# Patient Record
Sex: FEMALE | Race: WHITE | Employment: OTHER | URBAN - METROPOLITAN AREA
[De-identification: names, ages, dates, MRNs, and addresses within clinical notes are randomized per-mention and may not be internally consistent; named-entity substitution may affect disease eponyms.]

---

## 2017-12-08 ENCOUNTER — HOSPITAL ENCOUNTER (OUTPATIENT)
Dept: ENDOSCOPY | Age: 67
Discharge: HOME | End: 2017-12-08
Attending: INTERNAL MEDICINE | Admitting: INTERNAL MEDICINE

## 2017-12-08 NOTE — PLAN OF CARE
ADMISSION PRE-PROCEDURE INTRA-PROCEDURE POST-PROCEDURE: RECOVERY/ DISCHARGE   ASSESSMENT &  EVALUATION CONSULTS [x] Verify patient identification, allergies, vital signs, NPO, IV, & SPO2  [x] Complete the VARUN SCORE  [x] Consent form to treat signed  [x] History and Physical [x] Reassess patient after pre- procedure medication given  [x] GI physician evaluates pt  [x] Verify patient's name, allergies [x] Continuous monitoring of vital  signs, SPO2, LOC  [x] Emotional status  [x] Patient comfort level [x] Total system admission assessment with appropriate intervention  [x] Pain evaluation and management  [x] Discharge criteria met  [x] Discharge assessment with appropriate intervention  [x] Compare with pre-procedure status  [x] Discharge by appropriate physician   DIAGNOSTIC / TESTS [x]  Lab work ordered  [x]  Obtain and attach lab work to patient's chart  [x]  Report abnormals and F/U with physician [x] Assure needed test results are present [x] Diagnostic testing as indicated  [x] Obtained specimens sent to lab [x] Diagnostic testing as indicated     MEDICATIONS [x]  Conscious sedation medications  explained to patient  [x]  Start IV per physician's order  and/or protocol  [x]  Verify compliance of the colon prep  []  Pre-procedure med. as ordered  [x] Verify compliance of colon prep. [x] Re-check IV access [x] Assist with administration of IV conscious sedation medication  [x] Start O2  per  nasal cannula, if needed   [x] IV fluids as indicated/ordered  [x] Administration of medications as ordered  [x] Medications as prescribed  [x] D/C O2 therapy as ordered   PROCEDURE/TREATMENT [x] Specific order by GI physician  [x] Specific procedure as scheduled  [x]  Verify procedure as ordered [x] Time out/procedure verification checklist complete.  [x] EGD/Colonoscopy and related procedures  [x] Assist physician with the procedure [x] Treatment as indicated   NUTRITION / DIET [x] NPO after midnight, as ordered [x] Verify NPO status [x] IV fluids as support [x] Clear liquids and/or ice chips as ordered  [x] Tolerating clear liquids  [x] Special diet as ordered  [x] D/C IV fluids   ACTIVITY  [x] Assess level of function  [x]  Specified by physician  [x]  Activity as tolerated [x] Position on left side [x] Position on left side and reposition patient as physician ordered [x] Gradually elevate HOB to henrys position  [x] Position changes as patient tolerated  [x] Ambulate as pre-procedure   PATIENT / S.O. EDUCATION [x] Pre, Intra Post-procedure  teaching appropriate to procedure  [x] Conscious Sedation Teaching  [x] Pain Management - instructed [x] Encourage questions  [x] Clarify any concerns [x] Safety devices maintain to  prevent patient injury  [x] Assist and support patient  [x] Observe standard precautions [x] Physician confers with the family/S.O. [x] Short visit from family in [place] area  [x] Physician specific post-procedure orders  [x] S/S complications with proper [x]F/U; office visits F/U  [x] Review discharge instructions, medicine to family /S. O. [x] Medication/ special diet information given to family/ S.O. [x]  Copy of discharge instructions givn to family/S.O.   OUTCOME PLANNING  DISCHARGE PLAN [x] Patient/S. O. will verbalize understanding of admission procedure & expectations of outcome in realistic terms   [x] Patient verbalize the role of family/S. O. in plan of care  [x] Patient will have designated responsible person available for discharge.  [x] Patient will demonstrate an  understanding of the planned  procedure and its related procedures and conscious sedation [x] Patient will:  - receive services according to the           standards of care  - receive standards for conscious       sedation  - remain free of injury [x] Patient will:  - have stable vital signs based on Miryam Score   - be pain free or tolerable, have no bleeding  - have minimal abdominal distention   -  return to pre-procedure level of orientation   -  tolerate fluid with no nausea and vomiting  -  ambulate as pre-procedure ADL   - verbalize understanding of the discharge instructions     Via Torsten Sanchez 9:14 AM

## 2019-08-15 ENCOUNTER — HOSPITAL ENCOUNTER (OUTPATIENT)
Age: 69
Setting detail: OUTPATIENT SURGERY
Discharge: HOME | End: 2019-08-15
Attending: INTERNAL MEDICINE | Admitting: INTERNAL MEDICINE
Payer: MEDICARE

## 2019-08-15 VITALS
RESPIRATION RATE: 16 BRPM | SYSTOLIC BLOOD PRESSURE: 116 MMHG | WEIGHT: 140 LBS | HEIGHT: 64 IN | HEART RATE: 62 BPM | DIASTOLIC BLOOD PRESSURE: 62 MMHG | OXYGEN SATURATION: 92 % | TEMPERATURE: 97.1 F | BODY MASS INDEX: 23.9 KG/M2

## 2019-08-15 PROCEDURE — 2709999900 HC NON-CHARGEABLE SUPPLY: Performed by: INTERNAL MEDICINE

## 2019-08-15 PROCEDURE — 88305 TISSUE EXAM BY PATHOLOGIST: CPT

## 2019-08-15 PROCEDURE — 2580000003 HC RX 258: Performed by: INTERNAL MEDICINE

## 2019-08-15 PROCEDURE — 99152 MOD SED SAME PHYS/QHP 5/>YRS: CPT | Performed by: INTERNAL MEDICINE

## 2019-08-15 PROCEDURE — 7100000010 HC PHASE II RECOVERY - FIRST 15 MIN: Performed by: INTERNAL MEDICINE

## 2019-08-15 PROCEDURE — 2500000003 HC RX 250 WO HCPCS: Performed by: INTERNAL MEDICINE

## 2019-08-15 PROCEDURE — 3609010300 HC COLONOSCOPY W/BIOPSY SINGLE/MULTIPLE: Performed by: INTERNAL MEDICINE

## 2019-08-15 PROCEDURE — 99153 MOD SED SAME PHYS/QHP EA: CPT | Performed by: INTERNAL MEDICINE

## 2019-08-15 PROCEDURE — 6360000002 HC RX W HCPCS: Performed by: INTERNAL MEDICINE

## 2019-08-15 PROCEDURE — 7100000011 HC PHASE II RECOVERY - ADDTL 15 MIN: Performed by: INTERNAL MEDICINE

## 2019-08-15 RX ORDER — MEPERIDINE HYDROCHLORIDE 50 MG/ML
INJECTION INTRAMUSCULAR; INTRAVENOUS; SUBCUTANEOUS PRN
Status: DISCONTINUED | OUTPATIENT
Start: 2019-08-15 | End: 2019-08-15 | Stop reason: ALTCHOICE

## 2019-08-15 RX ORDER — AMPICILLIN AND SULBACTAM 2; 1 G/1; G/1
3 INJECTION, POWDER, FOR SOLUTION INTRAMUSCULAR; INTRAVENOUS ONCE
Status: DISCONTINUED | OUTPATIENT
Start: 2019-08-15 | End: 2019-08-15

## 2019-08-15 RX ORDER — MIDAZOLAM HYDROCHLORIDE 1 MG/ML
INJECTION INTRAMUSCULAR; INTRAVENOUS PRN
Status: DISCONTINUED | OUTPATIENT
Start: 2019-08-15 | End: 2019-08-15 | Stop reason: ALTCHOICE

## 2019-08-15 RX ADMIN — AMPICILLIN SODIUM AND SULBACTAM SODIUM 3 G: 2; 1 INJECTION, POWDER, FOR SOLUTION INTRAMUSCULAR; INTRAVENOUS at 07:44

## 2019-08-15 ASSESSMENT — PAIN - FUNCTIONAL ASSESSMENT
PAIN_FUNCTIONAL_ASSESSMENT: 0-10
PAIN_FUNCTIONAL_ASSESSMENT: FACES

## 2023-06-22 ENCOUNTER — ANESTHESIA EVENT (OUTPATIENT)
Dept: ENDOSCOPY | Age: 73
End: 2023-06-22
Payer: MEDICARE

## 2023-06-23 ENCOUNTER — ANESTHESIA (OUTPATIENT)
Dept: ENDOSCOPY | Age: 73
End: 2023-06-23
Payer: MEDICARE

## 2023-06-23 ENCOUNTER — HOSPITAL ENCOUNTER (OUTPATIENT)
Age: 73
Setting detail: OUTPATIENT SURGERY
Discharge: HOME | End: 2023-06-23
Attending: INTERNAL MEDICINE | Admitting: INTERNAL MEDICINE
Payer: MEDICARE

## 2023-06-23 VITALS
SYSTOLIC BLOOD PRESSURE: 126 MMHG | BODY MASS INDEX: 22.2 KG/M2 | DIASTOLIC BLOOD PRESSURE: 56 MMHG | TEMPERATURE: 97.3 F | HEIGHT: 64 IN | WEIGHT: 130 LBS | RESPIRATION RATE: 16 BRPM | OXYGEN SATURATION: 97 % | HEART RATE: 66 BPM

## 2023-06-23 DIAGNOSIS — K51.90 ULCERATIVE COLITIS WITHOUT COMPLICATIONS, UNSPECIFIED LOCATION (HCC): ICD-10-CM

## 2023-06-23 PROCEDURE — 3609010300 HC COLONOSCOPY W/BIOPSY SINGLE/MULTIPLE: Performed by: INTERNAL MEDICINE

## 2023-06-23 PROCEDURE — 7100000011 HC PHASE II RECOVERY - ADDTL 15 MIN: Performed by: INTERNAL MEDICINE

## 2023-06-23 PROCEDURE — 7100000010 HC PHASE II RECOVERY - FIRST 15 MIN: Performed by: INTERNAL MEDICINE

## 2023-06-23 PROCEDURE — 6360000002 HC RX W HCPCS: Performed by: INTERNAL MEDICINE

## 2023-06-23 PROCEDURE — 3700000000 HC ANESTHESIA ATTENDED CARE: Performed by: INTERNAL MEDICINE

## 2023-06-23 PROCEDURE — 3700000001 HC ADD 15 MINUTES (ANESTHESIA): Performed by: INTERNAL MEDICINE

## 2023-06-23 PROCEDURE — 2500000003 HC RX 250 WO HCPCS: Performed by: INTERNAL MEDICINE

## 2023-06-23 PROCEDURE — 2709999900 HC NON-CHARGEABLE SUPPLY: Performed by: INTERNAL MEDICINE

## 2023-06-23 PROCEDURE — 2580000003 HC RX 258: Performed by: FAMILY MEDICINE

## 2023-06-23 PROCEDURE — 88305 TISSUE EXAM BY PATHOLOGIST: CPT

## 2023-06-23 PROCEDURE — 2580000003 HC RX 258: Performed by: INTERNAL MEDICINE

## 2023-06-23 PROCEDURE — 6360000002 HC RX W HCPCS: Performed by: NURSE ANESTHETIST, CERTIFIED REGISTERED

## 2023-06-23 RX ORDER — PROPOFOL 10 MG/ML
INJECTION, EMULSION INTRAVENOUS PRN
Status: DISCONTINUED | OUTPATIENT
Start: 2023-06-23 | End: 2023-06-23 | Stop reason: SDUPTHER

## 2023-06-23 RX ORDER — LATANOPROST 50 UG/ML
1 SOLUTION/ DROPS OPHTHALMIC NIGHTLY
COMMUNITY
Start: 2023-04-28

## 2023-06-23 RX ORDER — AMPICILLIN AND SULBACTAM 2; 1 G/1; G/1
3000 INJECTION, POWDER, FOR SOLUTION INTRAMUSCULAR; INTRAVENOUS ONCE
Status: DISCONTINUED | OUTPATIENT
Start: 2023-06-23 | End: 2023-06-23 | Stop reason: CLARIF

## 2023-06-23 RX ORDER — ONDANSETRON 2 MG/ML
INJECTION INTRAMUSCULAR; INTRAVENOUS PRN
Status: DISCONTINUED | OUTPATIENT
Start: 2023-06-23 | End: 2023-06-23 | Stop reason: SDUPTHER

## 2023-06-23 RX ORDER — SODIUM CHLORIDE, SODIUM LACTATE, POTASSIUM CHLORIDE, CALCIUM CHLORIDE 600; 310; 30; 20 MG/100ML; MG/100ML; MG/100ML; MG/100ML
INJECTION, SOLUTION INTRAVENOUS CONTINUOUS
Status: DISCONTINUED | OUTPATIENT
Start: 2023-06-23 | End: 2023-06-23 | Stop reason: HOSPADM

## 2023-06-23 RX ORDER — ALENDRONATE SODIUM 10 MG/1
10 TABLET ORAL WEEKLY
COMMUNITY
Start: 2023-06-13

## 2023-06-23 RX ORDER — LIDOCAINE HYDROCHLORIDE 20 MG/ML
INJECTION, SOLUTION INTRAVENOUS PRN
Status: DISCONTINUED | OUTPATIENT
Start: 2023-06-23 | End: 2023-06-23 | Stop reason: SDUPTHER

## 2023-06-23 RX ORDER — PROPOFOL 10 MG/ML
INJECTION, EMULSION INTRAVENOUS CONTINUOUS PRN
Status: DISCONTINUED | OUTPATIENT
Start: 2023-06-23 | End: 2023-06-23 | Stop reason: SDUPTHER

## 2023-06-23 RX ADMIN — ONDANSETRON 4 MG: 2 INJECTION INTRAMUSCULAR; INTRAVENOUS at 08:23

## 2023-06-23 RX ADMIN — PROPOFOL 75 MG: 10 INJECTION, EMULSION INTRAVENOUS at 08:27

## 2023-06-23 RX ADMIN — LIDOCAINE HYDROCHLORIDE 50 MG: 20 INJECTION, SOLUTION INTRAVENOUS at 08:27

## 2023-06-23 RX ADMIN — SODIUM CHLORIDE, POTASSIUM CHLORIDE, SODIUM LACTATE AND CALCIUM CHLORIDE: 600; 310; 30; 20 INJECTION, SOLUTION INTRAVENOUS at 07:43

## 2023-06-23 RX ADMIN — SODIUM CHLORIDE 3000 MG: 900 INJECTION INTRAVENOUS at 07:41

## 2023-06-23 RX ADMIN — PROPOFOL 125 MCG/KG/MIN: 10 INJECTION, EMULSION INTRAVENOUS at 08:28

## 2023-06-23 ASSESSMENT — PAIN - FUNCTIONAL ASSESSMENT: PAIN_FUNCTIONAL_ASSESSMENT: 0-10

## 2023-06-23 NOTE — DISCHARGE INSTRUCTIONS
ENDOSCOPY DISCHARGE INSTRUCTIONS:    Call the physician that did your procedure for any questions or concerns:           DR. Marrianne Skiff:  [phone]               ACTIVITY:    There are potential side effects from the medications used for sedation and anesthesia during your procedure. These include:  Dizziness or light-headedness, confusion or memory loss, delayed reaction times, loss of coordination, nausea and vomiting. Because of your increased risk for injury, we ask that you observe the following precautions: For the next 24 hours,  DO NOT operate an automobile, bicycle, motorcycle, , power tools or large equipment of any kind. Do not drink alcohol, sign any legal documents or make any legal decisions for 24 hours. Do not bend your head over lower than your heart. DO sit on the side of bed/couch awhile before getting up. Plan on bedrest or quiet relaxation today. You may resume normal activities in 24 hours. DIET:    Your first meal today should be light, avoiding spicy and fatty foods. If you tolerate this first meal, then you may advance to your regular diet unless otherwise advised by your physician. NORMAL SYMPTOMS:  -Mild sore throat if youve had an EGD   -Gaseous discomfort if you've had an EGD or Colonoscopy. NOTIFY YOUR PHYSICIAN IF THESE SYMPTOMS OCCUR:  1. Fever (greater than 100)  5. Increased abdominal bloating  2. Severe pain    6. Excessive bleeding  3. Nausea and vomiting  7. Chest pain                                                                    4. Chills    8. Shortness of breath      ADDITIONAL INSTRUCTIONS:    Biopsy results: To be discussed at your follow-up visit. Educational Information:         Learning About Colitis  What is colitis? Colitis is swelling (inflammation) of the colon. The colon makes up most of the large intestine. Many conditions can cause colitis. What are the symptoms?   Symptoms of colitis may include diarrhea that may be bloody,

## 2023-06-23 NOTE — ANESTHESIA PRE PROCEDURE
Department of Anesthesiology  Preprocedure Note       Name:  Amber Hill   Age:  72 y.o.  :  1950                                          MRN:  [id number]         Date:  2023      Surgeon: Radha Alan):  Wanda Oliveira MD    Procedure: Procedure(s):  COLONOSCOPY WITH ANTIBIOTIC    Medications prior to admission:   Prior to Admission medications    Medication Sig Start Date End Date Taking? Authorizing Provider   dorzolamide (TRUSOPT) 2 % ophthalmic solution  10/13/14   Historical Provider, MD   azithromycin (AZASITE) 1 % ophthalmic solution 2 times daily. 14   Jeanne Jaimes MD   travoprost, benzalkonium, (TRAVATAN) 0.004 % ophthalmic solution 1 drop nightly. Historical Provider, MD       Current medications:    Current Facility-Administered Medications   Medication Dose Route Frequency Provider Last Rate Last Admin    lactated ringers IV soln infusion   IntraVENous Continuous Miladys Garcia MD        ampicillin-sulbactam (UNASYN) 3 (2-1) g injection 3,000 mg  3,000 mg IntraVENous Once Wanda Oliveira MD           Allergies:  No Known Allergies    Problem List:    Patient Active Problem List   Diagnosis Code    Glaucoma suspect H40.009    Cataract of both eyes H26.9    Blepharitis H01.009    Corneal ulcer of left eye H16.002       Past Medical History:        Diagnosis Date    Cataract     Bilaterally    Glaucoma     Suspect    Ulcerative colitis ([facility])     as young teenager       Past Surgical History:        Procedure Laterality Date    COLONOSCOPY  8/15/2019    COLONOSCOPY WITH BIOPSY performed by Wanda [facility] MD at [facility]      as a child -     HIP ARTHROPLASTY Right     JOINT REPLACEMENT Right     RIGHT THR    RETINAL DETACHMENT SURGERY         Social History:    Social History     Tobacco Use    Smoking status: Never    Smokeless tobacco: Never   Substance Use Topics    Alcohol use:  No                                Counseling given: Not

## 2023-06-23 NOTE — OP NOTE
[address], [address]                                OPERATIVE REPORT    PATIENT NAME: Saba Wilkins                         :        1950  MED REC NO:   [id number]                          ROOM:  ACCOUNT NO:   [de-identified]                           ADMIT DATE: 2023  PROVIDER:     Harleen Connor MD    DATE OF PROCEDURE:  2023    INDICATION FOR PROCEDURE:  Ulcerative colitis, family history of colon  cancer, and history of polyps. PROCEDURE:  With the patient in the left lateral position and after IV  Diprivan, the Olympus video colonoscope was inserted into the rectum and  carefully advanced to cecum. The colitis is in complete remission. Random biopsies were obtained. There was no sign of recurrent polyp, no  carcinoma, no diverticulosis. Scope was then removed without  complication. IMPRESSION:  Ulcerative colitis, in remission. No recurrent polyp. ESTIMATED BLOOD LOSS:  None. Surveillance colonoscopy is recommended in three years. Moris Romero MD    D: 2023 9:08:24       T: 2023 9:48:00     [email or web address]  Job#: [id number]     Doc#: [id number]    CC:   Harleen Connor MD

## 2023-06-23 NOTE — H&P
History and Physical / Pre-Sedation Assessment    Patient:  Manas Anaya   :   1950     Intended Procedure:  colonoscopy    HPI: ulcerative colitis. Fh of colon cancer    Past Medical History:   has a past medical history of Cataract, Glaucoma, and Ulcerative colitis ([facility] [address] Past Surgical History:   has a past surgical history that includes Retinal detachment surgery; Hip Arthroplasty (Right); eye surgery; joint replacement (Bilateral); and Colonoscopy (08/15/2019). Medications:  Prior to Admission medications    Medication Sig Start Date End Date Taking? Authorizing Provider   alendronate (FOSAMAX) 10 MG tablet Take 1 tablet by mouth once a week 23   Historical Provider, MD   latanoprost (XALATAN) 0.005 % ophthalmic solution Place 1 drop into both eyes at bedtime 23   Historical Provider, MD   dorzolamide (TRUSOPT) 2 % ophthalmic solution  10/13/14   Historical Provider, MD   azithromycin (AZASITE) 1 % ophthalmic solution 2 times daily. Patient not taking: Reported on 14   Slime Patel MD   travoprost, benzalkonium, (TRAVATAN) 0.004 % ophthalmic solution 1 drop nightly  Patient not taking: Reported on 2023    Historical Provider, MD       Family History:  family history includes Cancer in her father; Cataracts in her father and mother; High Cholesterol in her father; Hypertension in her mother. Social History:   reports that she has never smoked. She has never used smokeless tobacco. She reports that she does not drink alcohol and does not use drugs. Allergies:  Patient has no known allergies. ROS:  twelve point system review was unremarkable except for above noted history. Nurses notes reviewed and agreed.   Medications reviewed    Physical Exam:  Vital Signs: BP (!) 172/75   Pulse 73   Temp 97.2 °F (36.2 °C) (Tympanic)   Resp 17   Ht 5' 4\" (1.626 m)   Wt 130 lb (59 kg)   SpO2 100%   BMI 22.31 kg/m²    Skin: normal  HEENT: normal  Neck:

## 2023-06-23 NOTE — ANESTHESIA POSTPROCEDURE EVALUATION
Department of Anesthesiology  Postprocedure Note    Patient: Arelis Foreman  MRN: [id number]  YOB: 1950  Date of evaluation: 6/23/2023      Procedure Summary     Date: 06/23/23 Room / Location: [place] [address] / [facility]    Anesthesia Start: 0820 Anesthesia Stop: 0848    Procedure: COLONOSCOPY WITH BIOPSY Diagnosis:       Ulcerative colitis without complications, unspecified location ([address]      (Ulcerative colitis without complications, unspecified location ([facility] [address] [K51.90])    Surgeons: Radha Hernandez MD Responsible Provider: John Sullivan DO    Anesthesia Type: MAC ASA Status: 2          Anesthesia Type: No value filed.     Miryam Phase I: Miryam Score: 10    Miryam Phase II: Miryam Score: 10      Anesthesia Post Evaluation    Patient location during evaluation: PACU  Patient participation: complete - patient participated  Level of consciousness: awake and alert  Airway patency: patent  Nausea & Vomiting: no nausea and no vomiting  Cardiovascular status: blood pressure returned to baseline  Respiratory status: acceptable  Hydration status: euvolemic

## 2023-06-23 NOTE — PROGRESS NOTES
Unasyn 3 GM IV ordered for pt receive in pre-op. Unasyn infusion completed,  no adverse effects noted. LR infusing at this time. Pt awake, alert and oriented x4, resting quietly  with  at bedside. Pt's Necklace and Rings place in denture cup and given to pt's .

## 2023-09-25 ENCOUNTER — OFFICE VISIT (OUTPATIENT)
Dept: ENT CLINIC | Age: 73
End: 2023-09-25
Payer: MEDICARE

## 2023-09-25 VITALS
DIASTOLIC BLOOD PRESSURE: 83 MMHG | SYSTOLIC BLOOD PRESSURE: 137 MMHG | TEMPERATURE: 96.9 F | BODY MASS INDEX: 22.36 KG/M2 | WEIGHT: 131 LBS | HEART RATE: 72 BPM | HEIGHT: 64 IN

## 2023-09-25 DIAGNOSIS — H61.21 IMPACTED CERUMEN OF RIGHT EAR: Primary | ICD-10-CM

## 2023-09-25 PROCEDURE — 69210 REMOVE IMPACTED EAR WAX UNI: CPT | Performed by: OTOLARYNGOLOGY

## 2023-09-25 NOTE — PROGRESS NOTES
Cerumen impaction. Patient here for cerumen impaction. PE: Right cerumen impaction. Cerumen  Pre operative diagnosis: Cerumen impaction on the right  Post operative diagnosis: Same  Procedure: on the right cerumenectomy    After consent an operating microscope was utilized to visualize the external auditory canals bilaterally. Cerumen was removed with curettes and berger suctions on the the right side. The tympanic membrane is intact. No fluid visualized in the middle ear. No complications. I attest I performed the entire procedure myself. Follow up as needed.

## 2024-08-19 ENCOUNTER — OFFICE VISIT (OUTPATIENT)
Dept: ENT CLINIC | Age: 74
End: 2024-08-19
Payer: MEDICARE

## 2024-08-19 VITALS
HEIGHT: 64 IN | BODY MASS INDEX: 22.5 KG/M2 | HEART RATE: 73 BPM | SYSTOLIC BLOOD PRESSURE: 142 MMHG | DIASTOLIC BLOOD PRESSURE: 83 MMHG | TEMPERATURE: 97.3 F | WEIGHT: 131.8 LBS

## 2024-08-19 DIAGNOSIS — R07.0 THROAT PAIN: Primary | ICD-10-CM

## 2024-08-19 DIAGNOSIS — K21.9 LARYNGOPHARYNGEAL REFLUX (LPR): ICD-10-CM

## 2024-08-19 DIAGNOSIS — J38.3 VOCAL CORD GRANULOMA: ICD-10-CM

## 2024-08-19 PROCEDURE — 1123F ACP DISCUSS/DSCN MKR DOCD: CPT | Performed by: OTOLARYNGOLOGY

## 2024-08-19 PROCEDURE — G8420 CALC BMI NORM PARAMETERS: HCPCS | Performed by: OTOLARYNGOLOGY

## 2024-08-19 PROCEDURE — 31575 DIAGNOSTIC LARYNGOSCOPY: CPT | Performed by: OTOLARYNGOLOGY

## 2024-08-19 PROCEDURE — 3017F COLORECTAL CA SCREEN DOC REV: CPT | Performed by: OTOLARYNGOLOGY

## 2024-08-19 PROCEDURE — 1036F TOBACCO NON-USER: CPT | Performed by: OTOLARYNGOLOGY

## 2024-08-19 PROCEDURE — G8427 DOCREV CUR MEDS BY ELIG CLIN: HCPCS | Performed by: OTOLARYNGOLOGY

## 2024-08-19 PROCEDURE — G8400 PT W/DXA NO RESULTS DOC: HCPCS | Performed by: OTOLARYNGOLOGY

## 2024-08-19 PROCEDURE — 1090F PRES/ABSN URINE INCON ASSESS: CPT | Performed by: OTOLARYNGOLOGY

## 2024-08-19 PROCEDURE — 99214 OFFICE O/P EST MOD 30 MIN: CPT | Performed by: OTOLARYNGOLOGY

## 2024-08-19 RX ORDER — PANTOPRAZOLE SODIUM 40 MG/1
40 TABLET, DELAYED RELEASE ORAL
Qty: 90 TABLET | Refills: 1 | Status: SHIPPED | OUTPATIENT
Start: 2024-08-19 | End: 2025-02-15

## 2024-08-19 RX ORDER — FAMOTIDINE 20 MG/1
20 TABLET, FILM COATED ORAL NIGHTLY
Qty: 90 TABLET | Refills: 1 | Status: SHIPPED | OUTPATIENT
Start: 2024-08-19 | End: 2025-02-15

## 2024-08-19 ASSESSMENT — ENCOUNTER SYMPTOMS
SINUS PRESSURE: 0
FACIAL SWELLING: 0
RHINORRHEA: 0
SINUS PAIN: 0
COUGH: 0
SHORTNESS OF BREATH: 0
SORE THROAT: 0
CHOKING: 0
EYE PAIN: 0
VOICE CHANGE: 0
DIARRHEA: 0
NAUSEA: 0
TROUBLE SWALLOWING: 0
EYE ITCHING: 0
EYE REDNESS: 0

## 2024-08-19 NOTE — PROGRESS NOTES
left  Procedure : FlexibleLaryngoscopy  Surgeon: John Gagnon  Anesthesia: Afrin with 2% lidocaine  Estimated Blood Loss: None    Procedure:   Flexible Laryngoscopy     After obtaining consent, the patient was placed in the examination chair in the upright position.  Decongestant and topical anesthetic was sprayed in the After allowing adequate time for hemostatic effect, the flexible 4 mm laryngoscope was passed via the     Nasal Septum: normal;   Nasal Findings: normal;   Nasopharynx: normal   Eustachian Tube: normal   Oropharynx: normal   Base of Tongue: normal   Epiglottis: normal   True Vocal Cord normal   False Vocal Cord: normal   True Vocal Cord Movement: normal   Hypopharynx Mucosa: normal  Arytenoids: normal     * Patient tolerated the procedure well with no complications   * Patient was instructed not to eat for 30 minutes following procedure.   * Patient was instructed that they may notice minor bleeding.    Attestation:   I was present for the entire viewing, including introduction and removal of the scope.    Assessment:      Diagnosis Orders   1. Throat pain  pantoprazole (PROTONIX) 40 MG tablet    famotidine (PEPCID) 20 MG tablet      2. Laryngopharyngeal reflux (LPR)  pantoprazole (PROTONIX) 40 MG tablet    famotidine (PEPCID) 20 MG tablet      3. 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   Conservative management with PPI's and H2 blockers.   Follow up in 6 weeks for laryngoscopy, office.   If persists. OR for biopsy and laser ablation.   The patient was counseled for LPR and throat pain and received a protonix and famotitide.  prescription medication(s) for this diagnosis.  The mechanism of action for the prescription(s) were explained/reviewed. The appropriate usage was described and technique for topical use explained if appropriate.  Questions from the patient were answered and the prescription(s) were e-prescribed to the appropriate pharmacy.      John

## (undated) DEVICE — FORCEPS BX L240CM JAW DIA2.8MM L CAP W/ NDL MIC MESH TOOTH

## (undated) DEVICE — FORCEPS BX L240CM DIA2.4MM L NDL RAD JAW 4 133340

## (undated) DEVICE — CANNULA SAMP CO2 AD GRN 7FT CO2 AND 7FT O2 TBNG UNIV CONN